# Patient Record
Sex: MALE | Race: WHITE | NOT HISPANIC OR LATINO | Employment: FULL TIME | URBAN - METROPOLITAN AREA
[De-identification: names, ages, dates, MRNs, and addresses within clinical notes are randomized per-mention and may not be internally consistent; named-entity substitution may affect disease eponyms.]

---

## 2017-02-13 ENCOUNTER — GENERIC CONVERSION - ENCOUNTER (OUTPATIENT)
Dept: OTHER | Facility: OTHER | Age: 57
End: 2017-02-13

## 2017-04-18 ENCOUNTER — ALLSCRIPTS OFFICE VISIT (OUTPATIENT)
Dept: OTHER | Facility: OTHER | Age: 57
End: 2017-04-18

## 2017-05-21 ENCOUNTER — HOSPITAL ENCOUNTER (EMERGENCY)
Facility: HOSPITAL | Age: 57
Discharge: HOME | End: 2017-05-22
Attending: EMERGENCY MEDICINE | Admitting: EMERGENCY MEDICINE
Payer: COMMERCIAL

## 2017-05-21 VITALS
SYSTOLIC BLOOD PRESSURE: 143 MMHG | WEIGHT: 213.19 LBS | OXYGEN SATURATION: 96 % | HEART RATE: 69 BPM | TEMPERATURE: 97.7 F | DIASTOLIC BLOOD PRESSURE: 86 MMHG | RESPIRATION RATE: 18 BRPM

## 2017-05-21 DIAGNOSIS — H10.829 ROSACEA BLEPHAROCONJUNCTIVITIS: Primary | ICD-10-CM

## 2017-05-21 RX ORDER — SIMVASTATIN 40 MG
40 TABLET ORAL
COMMUNITY
End: 2019-11-19

## 2017-05-21 RX ORDER — PROPARACAINE HYDROCHLORIDE 5 MG/ML
2 SOLUTION/ DROPS OPHTHALMIC ONCE
Status: COMPLETED | OUTPATIENT
Start: 2017-05-21 | End: 2017-05-21

## 2017-05-21 RX ORDER — ERYTHROMYCIN 5 MG/G
0.5 OINTMENT OPHTHALMIC ONCE
Status: COMPLETED | OUTPATIENT
Start: 2017-05-21 | End: 2017-05-21

## 2017-05-21 RX ORDER — ASPIRIN 81 MG/1
81 TABLET ORAL DAILY
COMMUNITY

## 2017-05-21 RX ORDER — OMEPRAZOLE 20 MG/1
20 CAPSULE, DELAYED RELEASE ORAL DAILY
COMMUNITY
Start: 2016-10-28

## 2017-05-21 RX ORDER — FAMOTIDINE 20 MG/1
20 TABLET, FILM COATED ORAL DAILY
COMMUNITY
End: 2019-11-19

## 2017-05-21 RX ORDER — ERYTHROMYCIN 5 MG/G
0.5 OINTMENT OPHTHALMIC
Qty: 3.5 G | Refills: 0 | Status: SHIPPED | OUTPATIENT
Start: 2017-05-21 | End: 2017-05-28

## 2017-05-21 RX ORDER — DOXYCYCLINE 50 MG/1
50 CAPSULE ORAL DAILY
COMMUNITY
Start: 2016-12-29

## 2017-05-21 RX ORDER — MELATONIN
5000 DAILY
COMMUNITY
End: 2019-11-19

## 2017-05-21 RX ADMIN — FLUORESCEIN SODIUM 1 STRIP: 1 STRIP OPHTHALMIC at 23:30

## 2017-05-21 RX ADMIN — ERYTHROMYCIN 0.5 INCH: 5 OINTMENT OPHTHALMIC at 23:30

## 2017-05-21 RX ADMIN — PROPARACAINE HYDROCHLORIDE 2 DROP: 5 SOLUTION/ DROPS OPHTHALMIC at 23:30

## 2017-05-22 PROCEDURE — 99283 EMERGENCY DEPT VISIT LOW MDM: CPT

## 2018-01-13 VITALS
DIASTOLIC BLOOD PRESSURE: 84 MMHG | WEIGHT: 211 LBS | HEIGHT: 70 IN | BODY MASS INDEX: 30.21 KG/M2 | SYSTOLIC BLOOD PRESSURE: 126 MMHG

## 2019-10-29 ENCOUNTER — HOSPITAL ENCOUNTER (EMERGENCY)
Facility: HOSPITAL | Age: 59
Discharge: HOME | End: 2019-10-29
Attending: EMERGENCY MEDICINE
Payer: OTHER MISCELLANEOUS

## 2019-10-29 ENCOUNTER — APPOINTMENT (EMERGENCY)
Dept: CT IMAGING | Facility: HOSPITAL | Age: 59
End: 2019-10-29
Payer: OTHER MISCELLANEOUS

## 2019-10-29 VITALS
SYSTOLIC BLOOD PRESSURE: 128 MMHG | RESPIRATION RATE: 20 BRPM | TEMPERATURE: 98.4 F | DIASTOLIC BLOOD PRESSURE: 70 MMHG | HEIGHT: 71 IN | BODY MASS INDEX: 30.1 KG/M2 | OXYGEN SATURATION: 97 % | WEIGHT: 215 LBS | HEART RATE: 99 BPM

## 2019-10-29 DIAGNOSIS — M54.17 LUMBOSACRAL RADICULOPATHY AT L4: Primary | ICD-10-CM

## 2019-10-29 DIAGNOSIS — M51.26 LUMBAR HERNIATED DISC: ICD-10-CM

## 2019-10-29 PROCEDURE — 99283 EMERGENCY DEPT VISIT LOW MDM: CPT

## 2019-10-29 PROCEDURE — 72131 CT LUMBAR SPINE W/O DYE: CPT

## 2019-10-29 PROCEDURE — 96372 THER/PROPH/DIAG INJ SC/IM: CPT

## 2019-10-29 PROCEDURE — 99284 EMERGENCY DEPT VISIT MOD MDM: CPT | Performed by: PHYSICIAN ASSISTANT

## 2019-10-29 RX ORDER — ACETAMINOPHEN 325 MG/1
975 TABLET ORAL ONCE
Status: COMPLETED | OUTPATIENT
Start: 2019-10-29 | End: 2019-10-29

## 2019-10-29 RX ORDER — KETOROLAC TROMETHAMINE 30 MG/ML
15 INJECTION, SOLUTION INTRAMUSCULAR; INTRAVENOUS ONCE
Status: COMPLETED | OUTPATIENT
Start: 2019-10-29 | End: 2019-10-29

## 2019-10-29 RX ORDER — LIDOCAINE 50 MG/G
1 PATCH TOPICAL ONCE
Status: DISCONTINUED | OUTPATIENT
Start: 2019-10-29 | End: 2019-10-29 | Stop reason: HOSPADM

## 2019-10-29 RX ADMIN — ACETAMINOPHEN 975 MG: 325 TABLET, FILM COATED ORAL at 12:11

## 2019-10-29 RX ADMIN — KETOROLAC TROMETHAMINE 15 MG: 30 INJECTION, SOLUTION INTRAMUSCULAR at 12:11

## 2019-10-29 RX ADMIN — LIDOCAINE 1 PATCH: 50 PATCH TOPICAL at 12:12

## 2019-10-29 NOTE — ED PROVIDER NOTES
History  Chief Complaint   Patient presents with    Back Pain     pt states he injured his back yesterday and is now c/o lower back pain that travels down his right leg  64yo male with a PMH of HLD and GERD presenting for evaluation of right-sided low back pain x 1 day  Patient was taking the garbage out when he felt a pull in his right lower back that radiated down to his right leg  His pain has been significant since that time  Patient has a tingling sensation in the anterior thigh to the right knee  He was seen at an urgent care yesterday and was prescribed ibuprofen, prednisone, and a muscle relaxer which he has been compliant with  Patient's pain has continued which prompted him to be re-evaluated  Patient reports that his legs have given out twice due to his symptoms  Pain is exacerbated by walking and twisting  No previous back injuries  Patient denies fever, chills, urinary retention, dysuria, incontinence, saddle anesthesia, weight loss, IV drug use  History provided by:  Patient   used: No    Back Pain   Location:  Lumbar spine  Quality:  Shooting  Radiates to:  L thigh  Pain severity:  Moderate  Pain is:  Unable to specify  Onset quality:  Sudden  Duration:  1 day  Timing:  Constant  Progression:  Worsening  Chronicity:  New  Context: twisting    Context: not falling    Relieved by:  Nothing  Worsened by:  Bending, movement and twisting  Ineffective treatments:  Ibuprofen and muscle relaxants (Prednisone)  Associated symptoms: numbness, paresthesias and tingling    Associated symptoms: no abdominal pain, no bladder incontinence, no bowel incontinence, no chest pain, no dysuria, no fever, no headaches, no perianal numbness and no weight loss        Prior to Admission Medications   Prescriptions Last Dose Informant Patient Reported? Taking?    Multiple Vitamins-Minerals (OCUVITE PO)   Yes No   Sig: Take by mouth daily   aspirin (ECOTRIN LOW STRENGTH) 81 mg EC tablet   Yes No Sig: Take 81 mg by mouth daily   cholecalciferol (VITAMIN D3) 1,000 units tablet   Yes No   Sig: Take 5,000 Units by mouth daily   doxycycline monohydrate (MONODOX) 50 mg capsule   Yes No   Si mg daily   famotidine (PEPCID) 20 mg tablet   Yes No   Sig: Take 20 mg by mouth daily   omeprazole (PriLOSEC) 20 mg delayed release capsule   Yes No   Si mg daily   simvastatin (ZOCOR) 40 mg tablet   Yes No   Sig: Take 40 mg by mouth daily at bedtime      Facility-Administered Medications: None       Past Medical History:   Diagnosis Date    Hyperlipidemia        History reviewed  No pertinent surgical history  History reviewed  No pertinent family history  I have reviewed and agree with the history as documented  Social History     Tobacco Use    Smoking status: Former Smoker     Last attempt to quit:      Years since quittin 8    Smokeless tobacco: Never Used   Substance Use Topics    Alcohol use: No    Drug use: No        Review of Systems   Constitutional: Negative for chills, fever and weight loss  Cardiovascular: Negative for chest pain  Gastrointestinal: Negative for abdominal pain and bowel incontinence  Genitourinary: Negative for bladder incontinence and dysuria  Musculoskeletal: Positive for back pain  Negative for joint swelling, neck pain and neck stiffness  Skin: Negative for wound  Neurological: Positive for tingling, numbness and paresthesias  Negative for headaches  Psychiatric/Behavioral: Negative for confusion  All other systems reviewed and are negative  Physical Exam  Physical Exam   Constitutional: He appears well-developed and well-nourished  No distress  Non-toxic appearing   HENT:   Head: Normocephalic and atraumatic  Right Ear: External ear normal    Left Ear: External ear normal    Eyes: Right eye exhibits no discharge  Left eye exhibits no discharge  No scleral icterus  Cardiovascular: Normal rate, regular rhythm and normal heart sounds  No murmur heard  Pulmonary/Chest: Effort normal and breath sounds normal  No stridor  No respiratory distress  He has no wheezes  He has no rales  Abdominal: He exhibits no distension  There is no tenderness  Musculoskeletal: Normal range of motion  He exhibits no edema or deformity  Right paraspinal tenderness in lumbar region  No midline tenderness   Neurological: He is alert  He has normal strength  He is not disoriented  GCS eye subscore is 4  GCS verbal subscore is 5  GCS motor subscore is 6  Reflex Scores:       Patellar reflexes are 2+ on the right side and 1+ on the left side  5/5 strength in b/l lower extremities  Gross sensation intact  Decreased patellar reflex on left  Skin: Skin is warm and dry  He is not diaphoretic  Psychiatric: He has a normal mood and affect  His behavior is normal    Nursing note and vitals reviewed  Vital Signs  ED Triage Vitals [10/29/19 1048]   Temperature Pulse Respirations Blood Pressure SpO2   98 4 °F (36 9 °C) 99 20 128/70 97 %      Temp Source Heart Rate Source Patient Position - Orthostatic VS BP Location FiO2 (%)   Oral Monitor Sitting Left arm --      Pain Score       Worst Possible Pain           Vitals:    10/29/19 1048   BP: 128/70   Pulse: 99   Patient Position - Orthostatic VS: Sitting         Visual Acuity      ED Medications  Medications   ketorolac (TORADOL) injection 15 mg (15 mg Intramuscular Given 10/29/19 1211)   acetaminophen (TYLENOL) tablet 975 mg (975 mg Oral Given 10/29/19 1211)       Diagnostic Studies  Results Reviewed     None                 CT spine lumbar without contrast   Final Result by Kayley Canada DO (10/29 1249)      Disc herniations at the L3-4, L4-5 and L5-S1 levels  At L3-4 there is a superiorly extruded right sided disc herniation with lateral recess stenosis and probable compression of the L4 nerve as it extends towards the neural foramen  Correlate for corresponding radiculopathy        Central disc herniations at the L4-5 and L5-S1 levels  Probable renal cysts, incompletely evaluated on this examination  Consider nonemergent renal ultrasound follow-up  Workstation performed: [id number]                    Procedures  Procedures       ED Course                 MDM  Number of Diagnoses or Management Options  Lumbar herniated disc: new and requires workup  Lumbosacral radiculopathy at L4: new and requires workup  Diagnosis management comments: 56-year-old male presenting for evaluation of right-sided low back pain radiating to the right thigh  Associated symptoms include paresthesias and subjective weakness to the right leg  No red flags in history including no saddle anesthesia or incontinence  On exam, full strength and sensation in bilateral legs  There is a decreased patellar reflex on the right side  Suspect herniated disc  CT lumbar spine obtained which reveals multilevel herniated disc with compression of L4 nerve root  This is consistent with patient's exam   Patient treated with Tylenol, Toradol, and lidocaine patch to ED with improvement in symptoms  Patient feels well for discharge  Advised patient to continue current regimen of steroid, muscle relaxer, and ibuprofen  Instructed patient to take Tylenol as well and had lidocaine patches  Referral given for physical therapy and orthopedics  Advised close PCP follow-up  Strict ED return precautions discussed  Patient expressed understanding and is agreeable to plan  Patient discharged in stable condition             Amount and/or Complexity of Data Reviewed  Tests in the radiology section of CPT®: ordered and reviewed  Review and summarize past medical records: yes  Independent visualization of images, tracings, or specimens: yes    Risk of Complications, Morbidity, and/or Mortality  Presenting problems: moderate  Diagnostic procedures: moderate  Management options: moderate    Patient Progress  Patient progress: stable      Disposition  Final diagnoses:   Lumbosacral radiculopathy at L4   Lumbar herniated disc     Time reflects when diagnosis was documented in both MDM as applicable and the Disposition within this note     Time User Action Codes Description Comment    10/29/2019  1:01  [facility] [address] [place] [M54 [id number]] Lumbosacral radiculopathy at L4     10/29/2019  1:01  [facility] [address] [place] [[id number]] Lumbar herniated disc       ED Disposition     ED Disposition Condition Date/Time Comment    Discharge Stable Tue Oct 29, 2019  1:01 PM [facility] discharge to home/self care  Follow-up Information     Follow up With Specialties Details Why [address] [facility] Schedule an appointment as soon as possible for a visit   [address]  [facility]  [place] [address] [facility] [address]  [address] [facility] Schedule an appointment as soon as possible for a visit   [address] [facility] [address]  [address] [facility] [address], [place], [place], [address] [facility]    [address] [facility]  [address] Emergency Department Emergency Medicine  If symptoms worsen [address] [facility] [address]  [address] [place] [address] ED, [address] [place], [place], [address]          Discharge Medication List as of 10/29/2019  1:04 PM      CONTINUE these medications which have NOT CHANGED    Details   aspirin (ECOTRIN LOW STRENGTH) 81 mg EC tablet Take 81 mg by mouth daily, Until Discontinued, Historical Med      cholecalciferol (VITAMIN D3) 1,000 units tablet Take 5,000 Units by mouth daily, Until Discontinued, Historical Med      doxycycline monohydrate (MONODOX) 50 mg capsule 50 mg daily, Starting 12/29/2016, Until Discontinued, Historical Med      famotidine (PEPCID) 20 mg tablet Take 20 mg by mouth daily, Until Discontinued, Historical Med      Multiple Vitamins-Minerals (OCUVITE PO) Take by mouth daily, Until Discontinued, Historical Med      omeprazole (PriLOSEC) 20 mg delayed release capsule 20 mg daily, Starting 10/28/2016, Until Discontinued, Historical Med      simvastatin (ZOCOR) 40 mg tablet Take 40 mg by mouth daily at bedtime, Until Discontinued, Historical Med           No discharge procedures on file      ED Provider  Electronically Signed by           Georgiana Galvez PA-C  10/29/19 3002

## 2019-10-29 NOTE — DISCHARGE INSTRUCTIONS
Continue taking muscle relaxer, ibuprofen, and prednisone  Start taking Tylenol 650mg every 6 hours  Use lidocaine patches daily (12 hours on, 12 hours off)  Follow-up with your PCP, physical therapy, and orthopedics

## 2019-11-19 VITALS
BODY MASS INDEX: 30.13 KG/M2 | HEART RATE: 92 BPM | SYSTOLIC BLOOD PRESSURE: 153 MMHG | HEIGHT: 71 IN | DIASTOLIC BLOOD PRESSURE: 86 MMHG | WEIGHT: 215.2 LBS

## 2019-11-19 DIAGNOSIS — M54.16 RADICULOPATHY, LUMBAR REGION: Primary | ICD-10-CM

## 2019-11-19 DIAGNOSIS — R20.0 RIGHT LEG NUMBNESS: ICD-10-CM

## 2019-11-19 DIAGNOSIS — R29.2 DECREASED RIGHT PATELLAR REFLEX: ICD-10-CM

## 2019-11-19 DIAGNOSIS — M51.26 LUMBAR HERNIATED DISC: ICD-10-CM

## 2019-11-19 PROCEDURE — 99204 OFFICE O/P NEW MOD 45 MIN: CPT | Performed by: EMERGENCY MEDICINE

## 2019-11-19 RX ORDER — ATORVASTATIN CALCIUM 40 MG/1
40 TABLET, FILM COATED ORAL DAILY
COMMUNITY
Start: 2019-11-14

## 2019-11-19 NOTE — PROGRESS NOTES
Assessment/Plan:    Diagnoses and all orders for this visit:    Radiculopathy, lumbar region  -     Ambulatory referral to Physical Therapy; Future    Right leg numbness  -     Ambulatory referral to Physical Therapy; Future    Lumbar herniated disc  -     Ambulatory referral to Physical Therapy; Future    Decreased right patellar reflex    Other orders  -     Cholecalciferol (D3 VITAMIN PO); Take 5,000 Units by mouth daily  -     atorvastatin (LIPITOR) 40 mg tablet; Take 40 mg by mouth daily     Patient presents with right-sided lumbar radiculopathy with extruded right lumbar disc seen on CT scan corresponding to his symptoms  He does note significant improvement status post prescription medications including prednisone ibuprofen and a muscle relaxant  We will start physical therapy for core strengthening and will return the patient to restricted duty at his approval   At this point in time we will hold off on referral to pain management but this may be considered in the future  He may continue ibuprofen  Patient is scheduled to f/u with [facility]   Work note provided, reviewed prior ER note and imaging  Return in about 2 weeks (around 12/3/2019)  Chief Complaint:     Chief Complaint   Patient presents with    Spine - Pain       Subjective:   Patient ID: Ruby Esteban is a 63 y o  male  WC DOI 10/28/19  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[employer]  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 starting at work while lifting and throwing 30lb bags of trash over his shoulder, experiencing right lower back pain but more significantly right leg pain more severe over the anterior aspect of the knee with radiation down the lower extremity past the knee to the ankle and foot area along with anterior numbness tingling  He was evaluated in urgent care, ER, CT Lumbar spine obtained revealing herniated discs, was treating with chiro  Prescribed prednisone, ibuprofen, tylenol, currently taking Ibuprofen    He did note that he had a decreased reflex of the knee on exam   Currently he feels much improved with his symptoms and is able to moving ambulate much better  He has been on light duty over the past week which is primarily been sedentary  At this point in time he does feel good enough to go back to light duty or close to full duty as he states there is no significant amounts of lifting normally for his job  Denies any changes in bowel or bladder habits  Review of Systems   Constitutional: Negative for chills and fever  HENT: Negative for sore throat and trouble swallowing  Eyes: Negative for pain and discharge  Respiratory: Negative for choking and shortness of breath  Cardiovascular: Negative for chest pain and palpitations  Gastrointestinal: Negative for abdominal pain and vomiting  Endocrine: Negative for cold intolerance and heat intolerance  Musculoskeletal: Positive for arthralgias and back pain  Neurological: Positive for numbness  Negative for dizziness and weakness  Psychiatric/Behavioral: Negative for self-injury and suicidal ideas  The following portions of the patient's chart were reviewed and updated as appropriate: Allergy:  No Known Allergies      Past Medical History:   Diagnosis Date    Hyperlipidemia        History reviewed  No pertinent surgical history      Social History     Socioeconomic History    Marital status: /Civil Union     Spouse name: Not on file    Number of children: Not on file    Years of education: Not on file    Highest education level: Not on file   Occupational History    Not on file   Social Needs    Financial resource strain: Not on file    Food insecurity:     Worry: Not on file     Inability: Not on file    Transportation needs:     Medical: Not on file     Non-medical: Not on file   Tobacco Use    Smoking status: Former Smoker     Last attempt to quit:      Years since quittin 8    Smokeless tobacco: Never Used Substance and Sexual Activity    Alcohol use: No    Drug use: No    Sexual activity: Not on file   Lifestyle    Physical activity:     Days per week: Not on file     Minutes per session: Not on file    Stress: Not on file   Relationships    Social connections:     Talks on phone: Not on file     Gets together: Not on file     Attends Anabaptist service: Not on file     Active member of club or organization: Not on file     Attends meetings of clubs or organizations: Not on file     Relationship status: Not on file    Intimate partner violence:     Fear of current or ex partner: Not on file     Emotionally abused: Not on file     Physically abused: Not on file     Forced sexual activity: Not on file   Other Topics Concern    Not on file   Social History Narrative    Not on file       Family History   Problem Relation Age of Onset    Stroke Mother     Diabetes Father        Medications:    Current Outpatient Medications:     aspirin (ECOTRIN LOW STRENGTH) 81 mg EC tablet, Take 81 mg by mouth daily, Disp: , Rfl:     atorvastatin (LIPITOR) 40 mg tablet, Take 40 mg by mouth daily, Disp: , Rfl:     Cholecalciferol (D3 VITAMIN PO), Take 5,000 Units by mouth daily, Disp: , Rfl:     doxycycline monohydrate (MONODOX) 50 mg capsule, 50 mg daily, Disp: , Rfl:     Multiple Vitamins-Minerals (OCUVITE PO), Take by mouth daily, Disp: , Rfl:     omeprazole (PriLOSEC) 20 mg delayed release capsule, 20 mg daily, Disp: , Rfl:     There is no problem list on file for this patient  Objective:  /86   Pulse 92   Ht 5' 11" (1 803 m)   Wt 97 6 kg (215 lb 3 2 oz)   BMI 30 01 kg/m²     Back Exam     Range of Motion   Extension: normal   Flexion: normal     Muscle Strength   Right Quadriceps:  5/5   Left Quadriceps:  5/5     Tests   Straight leg raise right: negative  Straight leg raise left: negative    Reflexes   Patellar reflexes: Decreased reflex on the right compared to the left      Other   Toe walk: normal  Heel walk: normal  Sensation: normal  Gait: normal   Erythema: no back redness    Comments:  Fatty lipoma was noted on the lower lumbar spine which are nontender            Physical Exam   Constitutional: He is oriented to person, place, and time  He appears well-developed and well-nourished  HENT:   Head: Normocephalic and atraumatic  Eyes: Conjunctivae are normal    Neck: Neck supple  Pulmonary/Chest: Effort normal    Neurological: He is alert and oriented to person, place, and time  Gait normal    Reflex Scores:       Patellar reflexes are 1+ on the right side and 2+ on the left side  Skin: Skin is warm and dry  Psychiatric: He has a normal mood and affect  His behavior is normal    Vitals reviewed  Neurologic Exam     Mental Status   Oriented to person, place, and time  Motor Exam     Strength   Right iliopsoas: 4/5  Left iliopsoas: 5/5  Right quadriceps: 5/5  Left quadriceps: 5/5    Sensory Exam   Right leg light touch: normal  Left leg light touch: normal    Gait, Coordination, and Reflexes     Gait  Gait: normal    Reflexes   Right patellar: 1+  Left patellar: 2+      Procedures    I have personally reviewed the written report of the pertinent studies     CT Lumbar Spine

## 2019-11-19 NOTE — LETTER
November 19, 2019     Patient: Arnie Ng   YOB: 1960   Date of Visit: 11/19/2019       To Whom it May Concern:    Arnie Ng is under my professional care  He was seen in my office on 11/19/2019  He may return to restricted duty:  Max lifting 25lb  Follow up in 2 weeks  If you have any questions or concerns, please don't hesitate to call           Sincerely,          Justin King MD        CC: No Recipients

## 2019-11-20 ENCOUNTER — APPOINTMENT (OUTPATIENT)
Dept: OCCUPATIONAL MEDICINE | Facility: CLINIC | Age: 59
End: 2019-11-20
Payer: OTHER MISCELLANEOUS

## 2019-11-20 PROCEDURE — 99213 OFFICE O/P EST LOW 20 MIN: CPT

## 2019-11-21 ENCOUNTER — TELEPHONE (OUTPATIENT)
Dept: OBGYN CLINIC | Facility: CLINIC | Age: 59
End: 2019-11-21

## 2019-11-21 NOTE — TELEPHONE ENCOUNTER
Campos Rivera from [employer] called in yesterday in regards to [place]  She is asking for a copy of the Physical Therapy script  I do not see a "release of medical information" but since this is Workers Comp I am not sure how that plays a role in it    Campos Rivera C/b # [phone]  Campos Rivera fax # [phone]

## 2019-12-04 ENCOUNTER — OFFICE VISIT (OUTPATIENT)
Dept: OBGYN CLINIC | Facility: MEDICAL CENTER | Age: 59
End: 2019-12-04
Payer: OTHER MISCELLANEOUS

## 2019-12-04 VITALS
BODY MASS INDEX: 30.8 KG/M2 | WEIGHT: 220 LBS | DIASTOLIC BLOOD PRESSURE: 76 MMHG | SYSTOLIC BLOOD PRESSURE: 117 MMHG | HEART RATE: 77 BPM | HEIGHT: 71 IN

## 2019-12-04 DIAGNOSIS — M54.16 RADICULOPATHY, LUMBAR REGION: Primary | ICD-10-CM

## 2019-12-04 DIAGNOSIS — R29.2 DECREASED RIGHT PATELLAR REFLEX: ICD-10-CM

## 2019-12-04 DIAGNOSIS — M51.26 LUMBAR HERNIATED DISC: ICD-10-CM

## 2019-12-04 DIAGNOSIS — R20.0 RIGHT LEG NUMBNESS: ICD-10-CM

## 2019-12-04 PROCEDURE — 99213 OFFICE O/P EST LOW 20 MIN: CPT | Performed by: EMERGENCY MEDICINE

## 2019-12-04 RX ORDER — IBUPROFEN 600 MG/1
600 TABLET ORAL EVERY 6 HOURS PRN
Qty: 30 TABLET | Refills: 1 | Status: SHIPPED | OUTPATIENT
Start: 2019-12-04 | End: 2020-01-07

## 2019-12-04 NOTE — PROGRESS NOTES
Assessment/Plan:    Diagnoses and all orders for this visit:    Radiculopathy, lumbar region  -     ibuprofen (MOTRIN) 600 mg tablet; Take 1 tablet (600 mg total) by mouth every 6 (six) hours as needed for mild pain    Right leg numbness    Lumbar herniated disc    Decreased right patellar reflex    Continue PT, ibuprofen, work restrictions  Patient notes improvement of symptoms objectively he does have mild weakness on the L to L3 distribution with decreased L4 patellar reflex on the right  Will hold off on referral to pain management at this time but we did discuss    Return in about 2 weeks (around 12/18/2019)  Chief Complaint:     Chief Complaint   Patient presents with    Spine - Follow-up       Subjective:   Patient ID: Arnie Ng is a 63 y o  male  WC DOI 10/28/19  Patient returns noticing improvement of symptoms he does notice some mild weakness of the right thigh  He will needs refill on ibuprofen he has been tolerating work restrictions and believes that staying active is helping his symptoms  He did start physical therapy has attended 1 formal thought session and has participated in some home exercises  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 starting at work while lifting and throwing 30lb bags of trash over his shoulder, experiencing right lower back pain but more significantly right leg pain more severe over the anterior aspect of the knee with radiation down the lower extremity past the knee to the ankle and foot area along with anterior numbness tingling  He was evaluated in urgent care, ER, CT Lumbar spine obtained revealing herniated discs, was treating with chiro  Prescribed prednisone, ibuprofen, tylenol, currently taking Ibuprofen  He did note that he had a decreased reflex of the knee on exam   Currently he feels much improved with his symptoms and is able to moving ambulate much better    He has been on light duty over the past week which is primarily been sedentary  At this point in time he does feel good enough to go back to light duty or close to full duty as he states there is no significant amounts of lifting normally for his job  Denies any changes in bowel or bladder habits  Review of Systems    The following portions of the patient's chart were reviewed and updated as appropriate: Allergy:  No Known Allergies      Past Medical History:   Diagnosis Date    Hyperlipidemia        History reviewed  No pertinent surgical history      Social History     Socioeconomic History    Marital status: /Civil Union     Spouse name: Not on file    Number of children: Not on file    Years of education: Not on file    Highest education level: Not on file   Occupational History    Not on file   Social Needs    Financial resource strain: Not on file    Food insecurity:     Worry: Not on file     Inability: Not on file    Transportation needs:     Medical: Not on file     Non-medical: Not on file   Tobacco Use    Smoking status: Former Smoker     Last attempt to quit:      Years since quittin 9    Smokeless tobacco: Never Used   Substance and Sexual Activity    Alcohol use: No    Drug use: No    Sexual activity: Not on file   Lifestyle    Physical activity:     Days per week: Not on file     Minutes per session: Not on file    Stress: Not on file   Relationships    Social connections:     Talks on phone: Not on file     Gets together: Not on file     Attends [employer]ist service: Not on file     Active member of club or organization: Not on file     Attends meetings of clubs or organizations: Not on file     Relationship status: Not on file    Intimate partner violence:     Fear of current or ex partner: Not on file     Emotionally abused: Not on file     Physically abused: Not on file     Forced sexual activity: Not on file   Other Topics Concern    Not on file   Social History Narrative    Not on file       Family History   Problem Relation Age of Onset    Stroke Mother     Diabetes Father        Medications:    Current Outpatient Medications:     aspirin (ECOTRIN LOW STRENGTH) 81 mg EC tablet, Take 81 mg by mouth daily, Disp: , Rfl:     atorvastatin (LIPITOR) 40 mg tablet, Take 40 mg by mouth daily, Disp: , Rfl:     Cholecalciferol (D3 VITAMIN PO), Take 5,000 Units by mouth daily, Disp: , Rfl:     doxycycline monohydrate (MONODOX) 50 mg capsule, 50 mg daily, Disp: , Rfl:     Multiple Vitamins-Minerals (OCUVITE PO), Take by mouth daily, Disp: , Rfl:     omeprazole (PriLOSEC) 20 mg delayed release capsule, 20 mg daily, Disp: , Rfl:     ibuprofen (MOTRIN) 600 mg tablet, Take 1 tablet (600 mg total) by mouth every 6 (six) hours as needed for mild pain, Disp: 30 tablet, Rfl: 1    There is no problem list on file for this patient  Objective:  /76   Pulse 77   Ht 5' 11" (1 803 m)   Wt 99 8 kg (220 lb)   BMI 30 68 kg/m²     Back Exam     Muscle Strength   Right Quadriceps:  4/5   Left Quadriceps:  5/5     Tests   Straight leg raise right: negative    Reflexes   Patellar: abnormal    Other   Toe walk: normal  Heel walk: normal  Gait: normal     Comments:  Decreased reflex L4 on the right    There is mild weakness of right hip flexion            Physical Exam      Neurologic Exam     Motor Exam     Strength   Right quadriceps: 4/5  Left quadriceps: 5/5      Procedures    I have personally reviewed the written report of the pertinent studies  CT Lumbar Spine  IMPRESSION:     Disc herniations at the L3-4, L4-5 and L5-S1 levels        At L3-4 there is a superiorly extruded right sided disc herniation with lateral recess stenosis and probable compression of the L4 nerve as it extends towards the neural foramen  Correlate for corresponding radiculopathy      Central disc herniations at the L4-5 and L5-S1 levels      Probable renal cysts, incompletely evaluated on this examination    Consider nonemergent renal ultrasound follow-up

## 2019-12-04 NOTE — LETTER
December 4, 2019     Patient: Sharonda Encinas   YOB: 1960   Date of Visit: 12/4/2019       To Whom it May Concern:    Sharonda Encinas is under my professional care  He was seen in my office on 12/4/2019  He may return to restricted duty:  Max lifting 25lb  Follow up in 2 weeks  If you have any questions or concerns, please don't hesitate to call           Sincerely,          Lina Bennett MD        CC: No Recipients

## 2019-12-17 ENCOUNTER — OFFICE VISIT (OUTPATIENT)
Dept: OBGYN CLINIC | Facility: MEDICAL CENTER | Age: 59
End: 2019-12-17
Payer: OTHER MISCELLANEOUS

## 2019-12-17 VITALS
HEIGHT: 71 IN | SYSTOLIC BLOOD PRESSURE: 126 MMHG | DIASTOLIC BLOOD PRESSURE: 80 MMHG | BODY MASS INDEX: 30.18 KG/M2 | HEART RATE: 85 BPM | WEIGHT: 215.6 LBS

## 2019-12-17 DIAGNOSIS — M54.16 RADICULOPATHY, LUMBAR REGION: Primary | ICD-10-CM

## 2019-12-17 DIAGNOSIS — M51.26 LUMBAR HERNIATED DISC: ICD-10-CM

## 2019-12-17 DIAGNOSIS — R20.0 RIGHT LEG NUMBNESS: ICD-10-CM

## 2019-12-17 DIAGNOSIS — R29.2 DECREASED RIGHT PATELLAR REFLEX: ICD-10-CM

## 2019-12-17 PROCEDURE — 99213 OFFICE O/P EST LOW 20 MIN: CPT | Performed by: EMERGENCY MEDICINE

## 2019-12-17 RX ORDER — MELOXICAM 7.5 MG/1
7.5 TABLET ORAL DAILY
Qty: 30 TABLET | Refills: 0 | Status: SHIPPED | OUTPATIENT
Start: 2019-12-17 | End: 2020-02-05

## 2019-12-17 NOTE — PATIENT INSTRUCTIONS
While taking meloxicam do not take any other NSAIDs such as Advil, ibuprofen, naproxen or Aleve  However you may take Tylenol    Continue physical therapy and work restrictions

## 2019-12-17 NOTE — LETTER
December 17, 2019     Patient: Bunny Tabor   YOB: 1960   Date of Visit: 12/17/2019       To Whom it May Concern:    Bunny Tabor is under my professional care  He was seen in my office on 12/17/2019  He may return to restricted duty:  Max lifting 25lb  Follow up in 3 weeks  If you have any questions or concerns, please don't hesitate to call           Sincerely,          Deacon Recio MD        CC: No Recipients

## 2019-12-17 NOTE — LETTER
December 17, 2019     Patient: Mitch Bui   YOB: 1960   Date of Visit: 12/17/2019       To Whom it May Concern:    Mitch Bui is under my professional care  He was seen in my office on 12/17/2019  He may return to restricted duty:  Max lifting 25lb  Follow up in 2 weeks  If you have any questions or concerns, please don't hesitate to call           Sincerely,          Jefferson Ovalles MD        CC: No Recipients

## 2019-12-17 NOTE — PROGRESS NOTES
Assessment/Plan:    Diagnoses and all orders for this visit:    Radiculopathy, lumbar region  -     meloxicam (MOBIC) 7 5 mg tablet; Take 1 tablet (7 5 mg total) by mouth daily    Lumbar herniated disc    Right leg numbness    Decreased right patellar reflex     Patient is improved overall he denies any pain, reflexes and strength have improved since last evaluation  We did discuss referral to Pain Management however patient declines at this time, as he does note improvement overall since onset  He should continue physical therapy I have prescribed meloxicam to replace ibuprofen and have provided instructions  Continue restrictions and PT  If no further improvement or worsening symptoms, T/C MRI Lumbar spine (as this is more specific and sensitive compared to CT) and referral to Pain  Return in about 3 weeks (around 1/7/2020)  Chief Complaint:     Chief Complaint   Patient presents with    Spine - Follow-up       Subjective:   Patient ID: Sharonda Encinas is a 63 y o  male  WC DOI 10/28/19  Patient returns with mild improvement, he has continued PT, denies 'pain' but does have discomfort of the lower back and right anterior thigh, as well as numbness of the right knee  He is taking ibuprofen usually once daily  He did notice weakness right leg trying to step up on stool at home  He has been tolerating work restrictions  Previous note:  Patient returns noticing improvement of symptoms he does notice some mild weakness of the right thigh  He will needs refill on ibuprofen he has been tolerating work restrictions and believes that staying active is helping his symptoms  He did start physical therapy has attended 1 formal thought session and has participated in some home exercises      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[employer]  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 starting at work while lifting and throwing 30lb bags of trash over his shoulder, experiencing right lower back pain but more significantly right leg pain more severe over the anterior aspect of the knee with radiation down the lower extremity past the knee to the ankle and foot area along with anterior numbness tingling  He was evaluated in urgent care, ER, CT Lumbar spine obtained revealing herniated discs, was treating with chiro  Prescribed prednisone, ibuprofen, tylenol, currently taking Ibuprofen  He did note that he had a decreased reflex of the knee on exam   Currently he feels much improved with his symptoms and is able to moving ambulate much better  He has been on light duty over the past week which is primarily been sedentary  At this point in time he does feel good enough to go back to light duty or close to full duty as he states there is no significant amounts of lifting normally for his job  Denies any changes in bowel or bladder habits  Review of Systems   Constitutional: Negative for fever  Respiratory: Negative for shortness of breath  Cardiovascular: Negative for chest pain  Gastrointestinal: Negative for abdominal pain  Musculoskeletal: Positive for back pain  Neurological: Negative for weakness  The following portions of the patient's chart were reviewed and updated as appropriate: Allergy:  No Known Allergies      Past Medical History:   Diagnosis Date    Hyperlipidemia        History reviewed  No pertinent surgical history      Social History     Socioeconomic History    Marital status: /Civil Union     Spouse name: Not on file    Number of children: Not on file    Years of education: Not on file    Highest education level: Not on file   Occupational History    Not on file   Social Needs    Financial resource strain: Not on file    Food insecurity:     Worry: Not on file     Inability: Not on file    Transportation needs:     Medical: Not on file     Non-medical: Not on file   Tobacco Use    Smoking status: Former Smoker     Last attempt to quit: 1997     Years since quittin 9    Smokeless tobacco: Never Used   Substance and Sexual Activity    Alcohol use: No    Drug use: No    Sexual activity: Not on file   Lifestyle    Physical activity:     Days per week: Not on file     Minutes per session: Not on file    Stress: Not on file   Relationships    Social connections:     Talks on phone: Not on file     Gets together: Not on file     Attends Taoist service: Not on file     Active member of club or organization: Not on file     Attends meetings of clubs or organizations: Not on file     Relationship status: Not on file    Intimate partner violence:     Fear of current or ex partner: Not on file     Emotionally abused: Not on file     Physically abused: Not on file     Forced sexual activity: Not on file   Other Topics Concern    Not on file   Social History Narrative    Not on file       Family History   Problem Relation Age of Onset    Stroke Mother     Diabetes Father        Medications:    Current Outpatient Medications:     aspirin (ECOTRIN LOW STRENGTH) 81 mg EC tablet, Take 81 mg by mouth daily, Disp: , Rfl:     atorvastatin (LIPITOR) 40 mg tablet, Take 40 mg by mouth daily, Disp: , Rfl:     Cholecalciferol (D3 VITAMIN PO), Take 5,000 Units by mouth daily, Disp: , Rfl:     doxycycline monohydrate (MONODOX) 50 mg capsule, 50 mg daily, Disp: , Rfl:     ibuprofen (MOTRIN) 600 mg tablet, Take 1 tablet (600 mg total) by mouth every 6 (six) hours as needed for mild pain, Disp: 30 tablet, Rfl: 1    Multiple Vitamins-Minerals (OCUVITE PO), Take by mouth daily, Disp: , Rfl:     omeprazole (PriLOSEC) 20 mg delayed release capsule, 20 mg daily, Disp: , Rfl:     meloxicam (MOBIC) 7 5 mg tablet, Take 1 tablet (7 5 mg total) by mouth daily, Disp: 30 tablet, Rfl: 0    There is no problem list on file for this patient        Objective:  /80   Pulse 85   Ht 5' 11" (1 803 m)   Wt 97 8 kg (215 lb 9 6 oz)   BMI 30 07 kg/m²     Back Exam     Muscle Strength   The patient has normal back strength  Reflexes   Patellar: abnormal    Other   Toe walk: normal  Heel walk: normal  Sensation: normal  Gait: normal   Erythema: no back redness    Comments: There is decreased right patellar reflex compared to the left however this is improved since last evaluation  There is improvement of the strength of the right lower extremity compared to last evaluation            Physical Exam      Neurologic Exam    Procedures    I have personally reviewed the written report of the pertinent studies  CT Lumbar Spine  IMPRESSION:     Disc herniations at the L3-4, L4-5 and L5-S1 levels        At L3-4 there is a superiorly extruded right sided disc herniation with lateral recess stenosis and probable compression of the L4 nerve as it extends towards the neural foramen  Correlate for corresponding radiculopathy      Central disc herniations at the L4-5 and L5-S1 levels      Probable renal cysts, incompletely evaluated on this examination    Consider nonemergent renal ultrasound follow-up

## 2020-01-07 ENCOUNTER — OFFICE VISIT (OUTPATIENT)
Dept: OBGYN CLINIC | Facility: MEDICAL CENTER | Age: 60
End: 2020-01-07
Payer: OTHER MISCELLANEOUS

## 2020-01-07 VITALS
DIASTOLIC BLOOD PRESSURE: 85 MMHG | WEIGHT: 219.4 LBS | SYSTOLIC BLOOD PRESSURE: 125 MMHG | BODY MASS INDEX: 30.72 KG/M2 | HEART RATE: 98 BPM | HEIGHT: 71 IN

## 2020-01-07 DIAGNOSIS — M51.26 LUMBAR HERNIATED DISC: ICD-10-CM

## 2020-01-07 DIAGNOSIS — M54.16 RADICULOPATHY, LUMBAR REGION: Primary | ICD-10-CM

## 2020-01-07 DIAGNOSIS — R20.0 RIGHT LEG NUMBNESS: ICD-10-CM

## 2020-01-07 DIAGNOSIS — R29.2 DECREASED RIGHT PATELLAR REFLEX: ICD-10-CM

## 2020-01-07 PROCEDURE — 99213 OFFICE O/P EST LOW 20 MIN: CPT | Performed by: EMERGENCY MEDICINE

## 2020-01-07 NOTE — LETTER
January 7, 2020     Patient: Elizabeth Cuevas   YOB: 1960   Date of Visit: 1/7/2020       To Whom it May Concern:    Elizabeth Cuevas is under my professional care  He was seen in my office on 1/7/2020  He may continue restricted duty:  Max lifting 25lb  Follow up in 3 weeks  If you have any questions or concerns, please don't hesitate to call           Sincerely,          Diego Ellis MD        CC: No Recipients

## 2020-01-07 NOTE — PROGRESS NOTES
Assessment/Plan:    Diagnoses and all orders for this visit:    Radiculopathy, lumbar region  -     Ambulatory referral to Physical Therapy; Future    Lumbar herniated disc  -     Ambulatory referral to Physical Therapy; Future    Right leg numbness    Decreased right patellar reflex  -     Ambulatory referral to Physical Therapy; Future    Patient with improving symptoms we will order more physical therapy I have recommended he take hiatus from his meloxicam continue work restrictions follow-up in 3 weeks  Return in about 3 weeks (around 1/28/2020)  Chief Complaint:     Chief Complaint   Patient presents with    Spine - Follow-up       Subjective:   Patient ID: Sergio Van is a 63 y o  male  WC DOI 10/28/19  Patient returns with mild improvement, pain has improved from 6/10 to 4/10  He states pain in the quad "has gone away"  Tolerating work restrictions  He is requesting more PT  Denies weakness  Taking meloxicam       Previous note:  Patient returns with mild improvement, he has continued PT, denies 'pain' but does have discomfort of the lower back and right anterior thigh, as well as numbness of the right knee  He is taking ibuprofen usually once daily  He did notice weakness right leg trying to step up on stool at home  He has been tolerating work restrictions  Previous note:  Patient returns noticing improvement of symptoms he does notice some mild weakness of the right thigh  He will needs refill on ibuprofen he has been tolerating work restrictions and believes that staying active is helping his symptoms  He did start physical therapy has attended 1 formal thought session and has participated in some home exercises      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[employer]  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 starting at work while lifting and throwing 30lb bags of trash over his shoulder, experiencing right lower back pain but more significantly right leg pain more severe over the anterior aspect of the knee with radiation down the lower extremity past the knee to the ankle and foot area along with anterior numbness tingling  He was evaluated in urgent care, ER, CT Lumbar spine obtained revealing herniated discs, was treating with chiro  Prescribed prednisone, ibuprofen, tylenol, currently taking Ibuprofen  He did note that he had a decreased reflex of the knee on exam   Currently he feels much improved with his symptoms and is able to moving ambulate much better  He has been on light duty over the past week which is primarily been sedentary  At this point in time he does feel good enough to go back to light duty or close to full duty as he states there is no significant amounts of lifting normally for his job  Denies any changes in bowel or bladder habits  Review of Systems    The following portions of the patient's chart were reviewed and updated as appropriate: Allergy:  No Known Allergies      Past Medical History:   Diagnosis Date    Hyperlipidemia        History reviewed  No pertinent surgical history      Social History     Socioeconomic History    Marital status: /Civil Union     Spouse name: Not on file    Number of children: Not on file    Years of education: Not on file    Highest education level: Not on file   Occupational History    Not on file   Social Needs    Financial resource strain: Not on file    Food insecurity:     Worry: Not on file     Inability: Not on file    Transportation needs:     Medical: Not on file     Non-medical: Not on file   Tobacco Use    Smoking status: Former Smoker     Last attempt to quit:      Years since quittin 0    Smokeless tobacco: Never Used   Substance and Sexual Activity    Alcohol use: No    Drug use: No    Sexual activity: Not on file   Lifestyle    Physical activity:     Days per week: Not on file     Minutes per session: Not on file    Stress: Not on file   Relationships    Social connections:     Talks on phone: Not on file     Gets together: Not on file     Attends Adventism service: Not on file     Active member of club or organization: Not on file     Attends meetings of clubs or organizations: Not on file     Relationship status: Not on file    Intimate partner violence:     Fear of current or ex partner: Not on file     Emotionally abused: Not on file     Physically abused: Not on file     Forced sexual activity: Not on file   Other Topics Concern    Not on file   Social History Narrative    Not on file       Family History   Problem Relation Age of Onset    Stroke Mother     Diabetes Father        Medications:    Current Outpatient Medications:     aspirin (ECOTRIN LOW STRENGTH) 81 mg EC tablet, Take 81 mg by mouth daily, Disp: , Rfl:     atorvastatin (LIPITOR) 40 mg tablet, Take 40 mg by mouth daily, Disp: , Rfl:     Cholecalciferol (D3 VITAMIN PO), Take 5,000 Units by mouth daily, Disp: , Rfl:     doxycycline monohydrate (MONODOX) 50 mg capsule, 50 mg daily, Disp: , Rfl:     meloxicam (MOBIC) 7 5 mg tablet, Take 1 tablet (7 5 mg total) by mouth daily, Disp: 30 tablet, Rfl: 0    Multiple Vitamins-Minerals (OCUVITE PO), Take by mouth daily, Disp: , Rfl:     omeprazole (PriLOSEC) 20 mg delayed release capsule, 20 mg daily, Disp: , Rfl:     There is no problem list on file for this patient  Objective:  /85   Pulse 98   Ht 5' 11" (1 803 m)   Wt 99 5 kg (219 lb 6 4 oz)   BMI 30 60 kg/m²     Back Exam     Muscle Strength   Right Quadriceps:  5/5   Left Quadriceps:  5/5     Reflexes   Patellar: abnormal    Other   Gait: normal             Physical Exam      Neurologic Exam     Motor Exam     Strength   Right quadriceps: 5/5  Left quadriceps: 5/5      Procedures    I have personally reviewed the written report of the pertinent studies       CT Lumbar Spine 10/2019

## 2020-02-05 ENCOUNTER — OFFICE VISIT (OUTPATIENT)
Dept: OBGYN CLINIC | Facility: MEDICAL CENTER | Age: 60
End: 2020-02-05
Payer: OTHER MISCELLANEOUS

## 2020-02-05 VITALS
WEIGHT: 217.2 LBS | HEIGHT: 71 IN | SYSTOLIC BLOOD PRESSURE: 127 MMHG | BODY MASS INDEX: 30.41 KG/M2 | HEART RATE: 74 BPM | DIASTOLIC BLOOD PRESSURE: 82 MMHG

## 2020-02-05 DIAGNOSIS — R29.2 DECREASED RIGHT PATELLAR REFLEX: ICD-10-CM

## 2020-02-05 DIAGNOSIS — M54.16 RADICULOPATHY, LUMBAR REGION: Primary | ICD-10-CM

## 2020-02-05 DIAGNOSIS — M51.26 LUMBAR HERNIATED DISC: ICD-10-CM

## 2020-02-05 DIAGNOSIS — R20.0 RIGHT LEG NUMBNESS: ICD-10-CM

## 2020-02-05 PROCEDURE — 99213 OFFICE O/P EST LOW 20 MIN: CPT | Performed by: EMERGENCY MEDICINE

## 2020-02-05 NOTE — PROGRESS NOTES
Assessment/Plan:    Diagnoses and all orders for this visit:    Radiculopathy, lumbar region  -     Ambulatory referral to Pain Management; Future    Lumbar herniated disc  -     Ambulatory referral to Pain Management; Future    Right leg numbness    Decreased right patellar reflex  -     Ambulatory referral to Pain Management; Future     Patient returns much improved overall he is advancing in physical therapy however he continues with decreased right patellar reflex and mild to minimal weakness of the right leg  I have reviewed the CT scan results which do show a right-sided extruded lumbar disc at the L3-L4 level corresponding to his symptoms  Would like to see him back after his referral to pain management to see what they have to offer for him  Continue work restrictions and therapy  Return for after Pain Management evaluation  Chief Complaint:     Chief Complaint   Patient presents with    Spine - Follow-up       Subjective:   Patient ID: Peter Clemons is a 63 y o  male  WC DOI 10/28/19  Patient returns with improving symptoms, "I feel pretty good", he is advancing in PT and noticing healthy and normal muscle soreness only from the exercises  He does get occasional lower back pain, 3/10, usually after his PT workouts  Other than symptoms associated with PT, he only notices difficulty getting up from kneeling possibly due to weakness  Since last evaluation he has not been taking the meloxicam and is only taking ibuprofen p r n  Lomira Blane work restrictions  Previous note:  Patient returns with mild improvement, pain has improved from 6/10 to 4/10  He states pain in the quad "has gone away"  Tolerating work restrictions  He is requesting more PT  Denies weakness    Taking meloxicam         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[employer]  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 starting at work while lifting and throwing 30lb bags of trash over his shoulder, experiencing right lower back pain but more significantly right leg pain more severe over the anterior aspect of the knee with radiation down the lower extremity past the knee to the ankle and foot area along with anterior numbness tingling  He was evaluated in urgent care, ER, CT Lumbar spine obtained revealing herniated discs, was treating with chiro  Prescribed prednisone, ibuprofen, tylenol, currently taking Ibuprofen  He did note that he had a decreased reflex of the knee on exam   Currently he feels much improved with his symptoms and is able to moving ambulate much better  He has been on light duty over the past week which is primarily been sedentary  At this point in time he does feel good enough to go back to light duty or close to full duty as he states there is no significant amounts of lifting normally for his job  Denies any changes in bowel or bladder habits  Review of Systems    The following portions of the patient's chart were reviewed and updated as appropriate: Allergy:  No Known Allergies      Past Medical History:   Diagnosis Date    Hyperlipidemia        History reviewed  No pertinent surgical history      Social History     Socioeconomic History    Marital status: /Civil Union     Spouse name: Not on file    Number of children: Not on file    Years of education: Not on file    Highest education level: Not on file   Occupational History    Not on file   Social Needs    Financial resource strain: Not on file    Food insecurity:     Worry: Not on file     Inability: Not on file    Transportation needs:     Medical: Not on file     Non-medical: Not on file   Tobacco Use    Smoking status: Former Smoker     Last attempt to quit:      Years since quittin     Smokeless tobacco: Never Used   Substance and Sexual Activity    Alcohol use: No    Drug use: No    Sexual activity: Not on file   Lifestyle    Physical activity:     Days per week: Not on file     Minutes per session: Not on file    Stress: Not on file   Relationships    Social connections:     Talks on phone: Not on file     Gets together: Not on file     Attends Hindu service: Not on file     Active member of club or organization: Not on file     Attends meetings of clubs or organizations: Not on file     Relationship status: Not on file    Intimate partner violence:     Fear of current or ex partner: Not on file     Emotionally abused: Not on file     Physically abused: Not on file     Forced sexual activity: Not on file   Other Topics Concern    Not on file   Social History Narrative    Not on file       Family History   Problem Relation Age of Onset    Stroke Mother     Diabetes Father        Medications:    Current Outpatient Medications:     aspirin (ECOTRIN LOW STRENGTH) 81 mg EC tablet, Take 81 mg by mouth daily, Disp: , Rfl:     atorvastatin (LIPITOR) 40 mg tablet, Take 40 mg by mouth daily, Disp: , Rfl:     Cholecalciferol (D3 VITAMIN PO), Take 5,000 Units by mouth daily, Disp: , Rfl:     doxycycline monohydrate (MONODOX) 50 mg capsule, 50 mg daily, Disp: , Rfl:     Multiple Vitamins-Minerals (OCUVITE PO), Take by mouth daily, Disp: , Rfl:     omeprazole (PriLOSEC) 20 mg delayed release capsule, 20 mg daily, Disp: , Rfl:     There is no problem list on file for this patient  Objective:  /82   Pulse 74   Ht 5' 11" (1 803 m)   Wt 98 5 kg (217 lb 3 2 oz)   BMI 30 29 kg/m²     Back Exam     Reflexes   Patellar: abnormal    Other   Toe walk: normal  Heel walk: normal  Gait: normal     Comments:  Mild weakness of the right quad/knee extension  Vs left            Physical Exam   Constitutional: He is oriented to person, place, and time  He appears well-developed and well-nourished  HENT:   Head: Normocephalic and atraumatic  Eyes: Conjunctivae are normal    Neck: Neck supple  Pulmonary/Chest: Effort normal    Neurological: He is alert and oriented to person, place, and time     Skin: Skin is warm and dry  Psychiatric: He has a normal mood and affect  His behavior is normal    Vitals reviewed  Neurologic Exam     Mental Status   Oriented to person, place, and time  Procedures    I have personally reviewed the written report of the pertinent studies  CT Lumbar Spine  IMPRESSION:     Disc herniations at the L3-4, L4-5 and L5-S1 levels        At L3-4 there is a superiorly extruded right sided disc herniation with lateral recess stenosis and probable compression of the L4 nerve as it extends towards the neural foramen  Correlate for corresponding radiculopathy      Central disc herniations at the L4-5 and L5-S1 levels      Probable renal cysts, incompletely evaluated on this examination  Consider nonemergent renal ultrasound follow-up

## 2020-02-05 NOTE — LETTER
February 5, 2020     Patient: Peter Poag   YOB: 1960   Date of Visit: 2/5/2020       To Whom it May Concern:    Peter Poag is under my professional care  He was seen in my office on 2/5/2020  He may continue restricted duty:  Max lifting 25lb  F/U after Pain Management evaluation  If you have any questions or concerns, please don't hesitate to call           Sincerely,          Remi Martinez MD        CC: No Recipients

## 2020-02-18 ENCOUNTER — TELEPHONE (OUTPATIENT)
Dept: PAIN MEDICINE | Facility: CLINIC | Age: 60
End: 2020-02-18

## 2020-02-18 NOTE — TELEPHONE ENCOUNTER
[facility]  [id number]  DOI 10/28/2019  Adj Nyasia Granados  # [phone]

## 2020-03-11 ENCOUNTER — CONSULT (OUTPATIENT)
Dept: PAIN MEDICINE | Facility: CLINIC | Age: 60
End: 2020-03-11
Payer: OTHER MISCELLANEOUS

## 2020-03-11 VITALS
DIASTOLIC BLOOD PRESSURE: 84 MMHG | RESPIRATION RATE: 20 BRPM | SYSTOLIC BLOOD PRESSURE: 126 MMHG | WEIGHT: 214.6 LBS | HEIGHT: 71 IN | HEART RATE: 69 BPM | BODY MASS INDEX: 30.04 KG/M2

## 2020-03-11 DIAGNOSIS — M51.26 LUMBAR HERNIATED DISC: ICD-10-CM

## 2020-03-11 DIAGNOSIS — M51.26 LUMBAR DISC HERNIATION: ICD-10-CM

## 2020-03-11 DIAGNOSIS — M54.16 RADICULOPATHY, LUMBAR REGION: ICD-10-CM

## 2020-03-11 DIAGNOSIS — R29.2 DECREASED RIGHT PATELLAR REFLEX: ICD-10-CM

## 2020-03-11 DIAGNOSIS — M54.16 LUMBAR RADICULOPATHY: Primary | ICD-10-CM

## 2020-03-11 PROCEDURE — 99244 OFF/OP CNSLTJ NEW/EST MOD 40: CPT | Performed by: ANESTHESIOLOGY

## 2020-03-11 NOTE — PROGRESS NOTES
Assessment  1  Lumbar radiculopathy - Right     2  Radiculopathy, lumbar region  Ambulatory referral to Pain Management   3  Lumbar herniated disc - Right  Ambulatory referral to Pain Management    FL spine and pain procedure   4  Decreased right patellar reflex - Right  Ambulatory referral to Pain Management   5  Lumbar disc herniation - Right         Plan    This is a 71-year-old male who presents today for initial consultation for management of low back pain with radiculitis  On physical examination, there is no gross motor deficits  Sensory testing is intact  There is decreased knee reflex on the right  MRI images were reviewed with patient in detail and all his questions were answered to his satisfaction  The patient's pain persists despite time, relative rest, activity modification and therapy  I recommend a [Right L3, L4] transforaminal epidural steroid injection to diminish any inflammatory component of the pain  We will initially use a transforaminal approach to better concentrate the steroid along the affected nerve root  The injection may need to be repeated based on the degree of pain relief following the initial injection  In the office today, we reviewed the nature of the patient's pathology in depth using diagrams and models  We discussed the approach we would use for the epidural steroid injection and provided literature for home review  The patient understands the risks associated with the procedure including bleeding, infection, tissue injury, allergic reaction and paralysis and provided written and verbal consent in the office today  My impressions and treatment recommendations were discussed in detail with the patient who verbalized understanding and had no further questions  Discharge instructions were provided  I personally saw and examined the patient and I agree with the above discussed plan of care      History of Present Illness    Darleen Ortiz is a 63 y o  male who presents today for initial consultation for management of low back pain and right leg pain  Of note, patient reports moderate pain which he rates 3/10  His pain is occasional, with no typical pattern  Pain is further described as pressure-like associated with numbness  He reports weakness down his right leg  Pain is aggravated with bending, walking and exercise  Patient uses heat/ice with moderate relief of pain  Patient has had chiropractic manipulation with moderate relief of pain  Patient denies bladder and or bowel issues  The patient denies fever or chills  CT scan demonstrates multilevel disc herniations  Patient was referred to me by Dr Allen Sanchez  I have personally reviewed and/or updated the patient's past medical history, past surgical history, family history, social history, current medications, allergies, and vital signs today  Review of Systems   Constitutional: Negative for fever and unexpected weight change  HENT: Negative for trouble swallowing  Eyes: Negative for visual disturbance  Respiratory: Negative for shortness of breath and wheezing  Cardiovascular: Negative for chest pain and palpitations  Gastrointestinal: Negative for constipation, diarrhea, nausea and vomiting  Endocrine: Negative for cold intolerance, heat intolerance and polydipsia  Genitourinary: Negative for difficulty urinating and frequency  Musculoskeletal: Positive for arthralgias, back pain and myalgias  Negative for gait problem and joint swelling  Skin: Negative for rash  Neurological: Positive for numbness  Negative for dizziness, seizures, syncope, weakness and headaches  Hematological: Does not bruise/bleed easily  Psychiatric/Behavioral: Negative for dysphoric mood  All other systems reviewed and are negative  There is no problem list on file for this patient  Past Medical History:   Diagnosis Date    Hyperlipidemia        History reviewed   No pertinent surgical history  Family History   Problem Relation Age of Onset   [place] Stroke Mother     Diabetes Father        Social History     Occupational History    Not on file   Tobacco Use    Smoking status: Former Smoker     Last attempt to quit:      Years since quittin 2    Smokeless tobacco: Never Used   Substance and Sexual Activity    Alcohol use: No    Drug use: No    Sexual activity: Not on file       Current Outpatient Medications on File Prior to Visit   Medication Sig    aspirin (ECOTRIN LOW STRENGTH) 81 mg EC tablet Take 81 mg by mouth daily    atorvastatin (LIPITOR) 40 mg tablet Take 40 mg by mouth daily    Cholecalciferol (D3 VITAMIN PO) Take 5,000 Units by mouth daily    doxycycline monohydrate (MONODOX) 50 mg capsule 50 mg daily    Multiple Vitamins-Minerals (OCUVITE PO) Take by mouth daily    omeprazole (PriLOSEC) 20 mg delayed release capsule 20 mg daily     No current facility-administered medications on file prior to visit  No Known Allergies    Physical Exam    /84   Pulse 69   Resp 20   Ht 5' 11" (1 803 m)   Wt 97 3 kg (214 lb 9 6 oz)   BMI 29 93 kg/m²     Constitutional: normal, well developed, well nourished, alert, in no distress and non-toxic and no overt pain behavior    Eyes: anicteric  HEENT: grossly intact  Neck: supple, symmetric, trachea midline and no masses   Pulmonary:even and unlabored  Cardiovascular:No edema or pitting edema present  Skin:Normal without rashes or lesions and well hydrated  Psychiatric:Mood and affect appropriate  Neurologic:Cranial Nerves II-XII grossly intact  Musculoskeletal:normal    Lumbar Spine Exam    Appearance:  Normal lordosis  Palpation/Tenderness:  left lumbar paraspinal tenderness  right lumbar paraspinal tenderness  Sensory:  no sensory deficits noted  Range of Motion:  Extension:  Moderately limited  with pain  Motor Strength:  Left foot dorsiflexion:  5/5  Left foot plantar flexion:  5/5  Right foot dorsiflexion:  5/5  Right foot plantar flexion:  5/5  Reflexes:  Right knee patella reflex 1+    Imaging    CT LUMBAR SPINE     INDICATION:   Back pain or radiculopathy, < 6 wks, uncomplicated      COMPARISON: None      TECHNIQUE:  Contiguous axial images through the lumbar spine were obtained  Sagittal and coronal reconstructions were performed        Radiation dose length product (DLP) for this visit:  779 mGy-cm   This examination, like all CT scans performed in the [facility], was performed utilizing techniques to minimize radiation dose exposure, including the use of iterative   reconstruction and automated exposure control        IMAGE QUALITY:  Diagnostic      FINDINGS:     ALIGNMENT:  Normal alignment of the lumbar spine  No spondylolisthesis or spondylolysis      VERTEBRAL BODIES:  No fracture  No lytic or blastic lesion      DEGENERATIVE CHANGES:     Lower Thoracic spine:  Normal lower thoracic disc spaces  ]     L1-2:  Normal disc height  No herniation  Normal facet joints  No canal or foraminal stenosis      L2-3:  Normal disc height  No herniation  Normal facet joints  No canal or foraminal stenosis      L3-4:  Slight loss of disc height with mild annular bulging  There is a superiorly extruded disc herniation in the right paramedian and subarticular aspect of the disc resulting in lateral recess stenosis and mass effect upon the right L3 nerve      There is a more inferiorly located central and left paracentral disc herniation distorting the anterolateral aspect of the thecal sac with mild left lateral canal stenosis  No L3 nerve impingement on the left      L4-5:  Mild annular bulging with a very small central disc protrusion  No canal stenosis  No foraminal nerve impingement      L5-S1:  Mild annular bulging with a moderate central disc protrusion abutting the ventral aspect of the thecal sac and both S1 nerves as they exit the thecal sac without discrete compression    No foraminal nerve impingement      PARASPINAL SOFT TISSUES:  Mild vascular calcification of the aorta  Suspected simple suspected renal cysts bilaterally, incompletely evaluated on this noncontrast examination      IMPRESSION:     Disc herniations at the L3-4, L4-5 and L5-S1 levels        At L3-4 there is a superiorly extruded right sided disc herniation with lateral recess stenosis and probable compression of the L4 nerve as it extends towards the neural foramen  Correlate for corresponding radiculopathy      Central disc herniations at the L4-5 and L5-S1 levels      Probable renal cysts, incompletely evaluated on this examination    Consider nonemergent renal ultrasound follow-up

## 2020-05-19 ENCOUNTER — HOSPITAL ENCOUNTER (OUTPATIENT)
Dept: RADIOLOGY | Facility: CLINIC | Age: 60
Discharge: HOME | End: 2020-05-19
Attending: ANESTHESIOLOGY | Admitting: ANESTHESIOLOGY
Payer: OTHER MISCELLANEOUS

## 2020-05-19 VITALS
DIASTOLIC BLOOD PRESSURE: 74 MMHG | RESPIRATION RATE: 20 BRPM | OXYGEN SATURATION: 95 % | TEMPERATURE: 97.9 F | HEART RATE: 70 BPM | SYSTOLIC BLOOD PRESSURE: 123 MMHG

## 2020-05-19 DIAGNOSIS — M51.26 LUMBAR HERNIATED DISC: ICD-10-CM

## 2020-05-19 PROCEDURE — 64484 NJX AA&/STRD TFRM EPI L/S EA: CPT | Performed by: ANESTHESIOLOGY

## 2020-05-19 PROCEDURE — 64483 NJX AA&/STRD TFRM EPI L/S 1: CPT | Performed by: ANESTHESIOLOGY

## 2020-05-19 RX ORDER — PAPAVERINE HCL 150 MG
20 CAPSULE, EXTENDED RELEASE ORAL ONCE
Status: COMPLETED | OUTPATIENT
Start: 2020-05-19 | End: 2020-05-19

## 2020-05-19 RX ORDER — LIDOCAINE HYDROCHLORIDE 10 MG/ML
5 INJECTION, SOLUTION EPIDURAL; INFILTRATION; INTRACAUDAL; PERINEURAL ONCE
Status: COMPLETED | OUTPATIENT
Start: 2020-05-19 | End: 2020-05-19

## 2020-05-19 RX ORDER — BUPIVACAINE HCL/PF 2.5 MG/ML
5 VIAL (ML) INJECTION ONCE
Status: COMPLETED | OUTPATIENT
Start: 2020-05-19 | End: 2020-05-19

## 2020-05-19 RX ADMIN — IOHEXOL 2 ML: 300 INJECTION, SOLUTION INTRAVENOUS at 09:58

## 2020-05-19 RX ADMIN — LIDOCAINE HYDROCHLORIDE 5 ML: 10 INJECTION, SOLUTION EPIDURAL; INFILTRATION; INTRACAUDAL; PERINEURAL at 09:58

## 2020-05-19 RX ADMIN — Medication 5 ML: at 09:58

## 2020-05-19 RX ADMIN — DEXAMETHASONE SODIUM PHOSPHATE 20 MG: 10 INJECTION, SOLUTION INTRAMUSCULAR; INTRAVENOUS at 09:58

## 2020-05-26 ENCOUNTER — TELEPHONE (OUTPATIENT)
Dept: PAIN MEDICINE | Facility: CLINIC | Age: 60
End: 2020-05-26

## 2020-06-23 ENCOUNTER — OFFICE VISIT (OUTPATIENT)
Dept: OBGYN CLINIC | Facility: MEDICAL CENTER | Age: 60
End: 2020-06-23
Payer: OTHER MISCELLANEOUS

## 2020-06-23 VITALS
WEIGHT: 222.8 LBS | DIASTOLIC BLOOD PRESSURE: 88 MMHG | BODY MASS INDEX: 31.19 KG/M2 | HEART RATE: 71 BPM | SYSTOLIC BLOOD PRESSURE: 135 MMHG | HEIGHT: 71 IN

## 2020-06-23 DIAGNOSIS — M54.16 RADICULOPATHY, LUMBAR REGION: Primary | ICD-10-CM

## 2020-06-23 DIAGNOSIS — R29.2 DECREASED RIGHT PATELLAR REFLEX: ICD-10-CM

## 2020-06-23 DIAGNOSIS — R20.0 RIGHT LEG NUMBNESS: ICD-10-CM

## 2020-06-23 PROCEDURE — 99213 OFFICE O/P EST LOW 20 MIN: CPT | Performed by: EMERGENCY MEDICINE

## 2020-06-23 RX ORDER — MELOXICAM 7.5 MG/1
7.5 TABLET ORAL DAILY
Qty: 30 TABLET | Refills: 0 | Status: SHIPPED | OUTPATIENT
Start: 2020-06-23

## 2020-07-07 ENCOUNTER — OFFICE VISIT (OUTPATIENT)
Dept: OBGYN CLINIC | Facility: MEDICAL CENTER | Age: 60
End: 2020-07-07
Payer: OTHER MISCELLANEOUS

## 2020-07-07 VITALS
BODY MASS INDEX: 31.02 KG/M2 | SYSTOLIC BLOOD PRESSURE: 118 MMHG | WEIGHT: 221.6 LBS | HEIGHT: 71 IN | DIASTOLIC BLOOD PRESSURE: 81 MMHG | HEART RATE: 76 BPM

## 2020-07-07 DIAGNOSIS — M51.26 LUMBAR HERNIATED DISC: ICD-10-CM

## 2020-07-07 DIAGNOSIS — R29.2 DECREASED RIGHT PATELLAR REFLEX: ICD-10-CM

## 2020-07-07 DIAGNOSIS — R20.0 RIGHT LEG NUMBNESS: ICD-10-CM

## 2020-07-07 DIAGNOSIS — M54.16 RADICULOPATHY, LUMBAR REGION: Primary | ICD-10-CM

## 2020-07-07 PROCEDURE — 99213 OFFICE O/P EST LOW 20 MIN: CPT | Performed by: EMERGENCY MEDICINE

## 2020-07-07 NOTE — LETTER
July 7, 2020     Patient: Jemma Mccrary   YOB: 1960   Date of Visit: 7/7/2020       To Whom it May Concern:    Jemma Mccrary is under my professional care  He was seen in my office on 7/7/2020  Continue full duty  Follow up as needed  If you have any questions or concerns, please don't hesitate to call           Sincerely,          Jay Brooks MD        CC: No Recipients

## 2020-07-07 NOTE — PROGRESS NOTES
Assessment/Plan:    Diagnoses and all orders for this visit:    Radiculopathy, lumbar region    Decreased right patellar reflex    Lumbar herniated disc    Right leg numbness     patient is very happy with improvement tolerating full duty denies any pain today states he is 85-90% back to baseline  He does note a history of back pain intermittently  At this point in time we will see him on an as-needed basis  Return if symptoms worsen or fail to improve  Chief Complaint:     Chief Complaint   Patient presents with    Spine - Follow-up       Subjective:   Patient ID: Maryse Laura is a 62 y o  male  WC DOI 10/28/19  Patient returns s/p trial of full duty which he tolerated well, he states he is 85-90% back to baseline, happy with improvement  Denies any pain today  States he only had to take the meloxicam 3 days since last evaluation  S/P RIGHT L3, L4 TFESI,       Review of Systems    The following portions of the patient's chart were reviewed and updated as appropriate: Allergy:  No Known Allergies      Past Medical History:   Diagnosis Date    Hyperlipidemia        History reviewed  No pertinent surgical history      Social History     Socioeconomic History    Marital status: /Civil Union     Spouse name: Not on file    Number of children: Not on file    Years of education: Not on file    Highest education level: Not on file   Occupational History    Not on file   Social Needs    Financial resource strain: Not on file    Food insecurity:     Worry: Not on file     Inability: Not on file    Transportation needs:     Medical: Not on file     Non-medical: Not on file   Tobacco Use    Smoking status: Former Smoker     Last attempt to quit:      Years since quittin 5    Smokeless tobacco: Never Used   Substance and Sexual Activity    Alcohol use: No    Drug use: No    Sexual activity: Not on file   Lifestyle    Physical activity:     Days per week: Not on file     Minutes per session: Not on file    Stress: Not on file   Relationships    Social connections:     Talks on phone: Not on file     Gets together: Not on file     Attends Advent service: Not on file     Active member of club or organization: Not on file     Attends meetings of clubs or organizations: Not on file     Relationship status: Not on file    Intimate partner violence:     Fear of current or ex partner: Not on file     Emotionally abused: Not on file     Physically abused: Not on file     Forced sexual activity: Not on file   Other Topics Concern    Not on file   Social History Narrative    Not on file       Family History   Problem Relation Age of Onset    Stroke Mother     Diabetes Father        Medications:    Current Outpatient Medications:     aspirin (ECOTRIN LOW STRENGTH) 81 mg EC tablet, Take 81 mg by mouth daily, Disp: , Rfl:     atorvastatin (LIPITOR) 40 mg tablet, Take 40 mg by mouth daily, Disp: , Rfl:     Cholecalciferol (D3 VITAMIN PO), Take 5,000 Units by mouth daily, Disp: , Rfl:     doxycycline monohydrate (MONODOX) 50 mg capsule, 50 mg daily, Disp: , Rfl:     meloxicam (MOBIC) 7 5 mg tablet, Take 1 tablet (7 5 mg total) by mouth daily, Disp: 30 tablet, Rfl: 0    Multiple Vitamins-Minerals (OCUVITE PO), Take by mouth daily, Disp: , Rfl:     omeprazole (PriLOSEC) 20 mg delayed release capsule, 20 mg daily, Disp: , Rfl:     Patient Active Problem List   Diagnosis    Lumbar radiculopathy    Decreased right patellar reflex - Right    Lumbar disc herniation       Objective:  /81   Pulse 76   Ht 5' 11" (1 803 m)   Wt 101 kg (221 lb 9 6 oz)   BMI 30 91 kg/m²     Back Exam     Range of Motion   The patient has normal back ROM  Other   Toe walk: normal  Heel walk: normal  Gait: normal             Physical Exam   Constitutional: He is oriented to person, place, and time  He appears well-developed and well-nourished  HENT:   Head: Normocephalic and atraumatic     Eyes: Conjunctivae are normal    Neck: Neck supple  Pulmonary/Chest: Effort normal    Neurological: He is alert and oriented to person, place, and time  Skin: Skin is warm and dry  Psychiatric: He has a normal mood and affect  His behavior is normal    Vitals reviewed  Neurologic Exam     Mental Status   Oriented to person, place, and time  Procedures    I have personally reviewed the written report of the pertinent studies

## 2020-07-18 DIAGNOSIS — R29.2 DECREASED RIGHT PATELLAR REFLEX: ICD-10-CM

## 2020-07-18 DIAGNOSIS — M54.16 RADICULOPATHY, LUMBAR REGION: ICD-10-CM

## 2020-07-18 DIAGNOSIS — R20.0 RIGHT LEG NUMBNESS: ICD-10-CM

## 2020-07-20 RX ORDER — MELOXICAM 7.5 MG/1
TABLET ORAL
Qty: 30 TABLET | Refills: 0 | OUTPATIENT
Start: 2020-07-20

## 2021-06-13 ENCOUNTER — APPOINTMENT (OUTPATIENT)
Dept: RADIOLOGY | Facility: CLINIC | Age: 61
End: 2021-06-13
Payer: OTHER MISCELLANEOUS

## 2021-06-13 ENCOUNTER — OCCMED (OUTPATIENT)
Dept: URGENT CARE | Facility: CLINIC | Age: 61
End: 2021-06-13
Payer: OTHER MISCELLANEOUS

## 2021-06-13 DIAGNOSIS — M25.571 RIGHT ANKLE PAIN, UNSPECIFIED CHRONICITY: Primary | ICD-10-CM

## 2021-06-13 DIAGNOSIS — M25.571 RIGHT ANKLE PAIN, UNSPECIFIED CHRONICITY: ICD-10-CM

## 2021-06-13 PROCEDURE — 99283 EMERGENCY DEPT VISIT LOW MDM: CPT | Performed by: NURSE PRACTITIONER

## 2021-06-13 PROCEDURE — 73610 X-RAY EXAM OF ANKLE: CPT

## 2021-06-13 PROCEDURE — G0382 LEV 3 HOSP TYPE B ED VISIT: HCPCS | Performed by: NURSE PRACTITIONER

## 2021-06-13 PROCEDURE — 73630 X-RAY EXAM OF FOOT: CPT

## 2021-06-17 ENCOUNTER — APPOINTMENT (OUTPATIENT)
Dept: URGENT CARE | Facility: CLINIC | Age: 61
End: 2021-06-17
Payer: OTHER MISCELLANEOUS

## 2021-06-17 PROCEDURE — 99213 OFFICE O/P EST LOW 20 MIN: CPT

## 2021-06-30 ENCOUNTER — APPOINTMENT (OUTPATIENT)
Dept: URGENT CARE | Facility: CLINIC | Age: 61
End: 2021-06-30
Payer: OTHER MISCELLANEOUS

## 2021-06-30 PROCEDURE — 99213 OFFICE O/P EST LOW 20 MIN: CPT | Performed by: PHYSICIAN ASSISTANT

## 2022-11-03 ENCOUNTER — APPOINTMENT (OUTPATIENT)
Dept: PHYSICAL THERAPY | Facility: CLINIC | Age: 62
End: 2022-11-03

## 2024-04-20 ENCOUNTER — HOSPITAL ENCOUNTER (EMERGENCY)
Facility: HOSPITAL | Age: 64
Discharge: HOME | End: 2024-04-20
Attending: EMERGENCY MEDICINE | Admitting: EMERGENCY MEDICINE
Payer: COMMERCIAL

## 2024-04-20 ENCOUNTER — APPOINTMENT (EMERGENCY)
Dept: RADIOLOGY | Facility: HOSPITAL | Age: 64
End: 2024-04-20
Payer: COMMERCIAL

## 2024-04-20 VITALS
DIASTOLIC BLOOD PRESSURE: 76 MMHG | HEIGHT: 71 IN | BODY MASS INDEX: 30.1 KG/M2 | TEMPERATURE: 99.5 F | OXYGEN SATURATION: 93 % | RESPIRATION RATE: 18 BRPM | SYSTOLIC BLOOD PRESSURE: 144 MMHG | HEART RATE: 72 BPM | WEIGHT: 215 LBS

## 2024-04-20 DIAGNOSIS — J40 BRONCHITIS: Primary | ICD-10-CM

## 2024-04-20 LAB
FLUAV RNA RESP QL NAA+PROBE: NEGATIVE
FLUBV RNA RESP QL NAA+PROBE: NEGATIVE
RSV RNA RESP QL NAA+PROBE: NEGATIVE
SARS-COV-2 RNA RESP QL NAA+PROBE: NEGATIVE

## 2024-04-20 PROCEDURE — 71045 X-RAY EXAM CHEST 1 VIEW: CPT

## 2024-04-20 PROCEDURE — 99284 EMERGENCY DEPT VISIT MOD MDM: CPT

## 2024-04-20 PROCEDURE — 0241U HB NFCT DS VIR RESP RNA 4 TRGT: CPT | Performed by: NURSE PRACTITIONER

## 2024-04-20 PROCEDURE — 99284 EMERGENCY DEPT VISIT MOD MDM: CPT | Performed by: NURSE PRACTITIONER

## 2024-04-20 RX ORDER — PREDNISONE 20 MG/1
60 TABLET ORAL DAILY
Qty: 15 TABLET | Refills: 0 | Status: SHIPPED | OUTPATIENT
Start: 2024-04-20 | End: 2024-04-25

## 2024-04-20 RX ORDER — PREDNISONE 20 MG/1
60 TABLET ORAL DAILY
Qty: 15 TABLET | Refills: 0 | Status: SHIPPED | OUTPATIENT
Start: 2024-04-20 | End: 2024-04-20

## 2024-04-20 RX ORDER — IPRATROPIUM BROMIDE AND ALBUTEROL SULFATE 2.5; .5 MG/3ML; MG/3ML
3 SOLUTION RESPIRATORY (INHALATION) ONCE
Status: COMPLETED | OUTPATIENT
Start: 2024-04-20 | End: 2024-04-20

## 2024-04-20 RX ORDER — IPRATROPIUM BROMIDE AND ALBUTEROL SULFATE 2.5; .5 MG/3ML; MG/3ML
3 SOLUTION RESPIRATORY (INHALATION) 4 TIMES DAILY
Qty: 60 ML | Refills: 0 | Status: SHIPPED | OUTPATIENT
Start: 2024-04-20 | End: 2024-04-25

## 2024-04-20 RX ORDER — AMOXICILLIN 500 MG/1
500 CAPSULE ORAL 3 TIMES DAILY
Qty: 21 CAPSULE | Refills: 0 | Status: SHIPPED | OUTPATIENT
Start: 2024-04-20 | End: 2024-04-20

## 2024-04-20 RX ORDER — AMOXICILLIN 500 MG/1
500 CAPSULE ORAL 3 TIMES DAILY
Qty: 21 CAPSULE | Refills: 0 | Status: SHIPPED | OUTPATIENT
Start: 2024-04-20 | End: 2024-04-27

## 2024-04-20 RX ORDER — SODIUM CHLORIDE FOR INHALATION 0.9 %
3 VIAL, NEBULIZER (ML) INHALATION ONCE
Status: COMPLETED | OUTPATIENT
Start: 2024-04-20 | End: 2024-04-20

## 2024-04-20 RX ORDER — ALBUTEROL SULFATE 90 UG/1
2 AEROSOL, METERED RESPIRATORY (INHALATION) EVERY 6 HOURS PRN
Qty: 6.7 G | Refills: 0 | Status: SHIPPED | OUTPATIENT
Start: 2024-04-20 | End: 2024-04-20 | Stop reason: ALTCHOICE

## 2024-04-20 RX ORDER — KETOROLAC TROMETHAMINE 30 MG/ML
15 INJECTION, SOLUTION INTRAMUSCULAR; INTRAVENOUS ONCE
Status: COMPLETED | OUTPATIENT
Start: 2024-04-20 | End: 2024-04-20

## 2024-04-20 RX ADMIN — ISODIUM CHLORIDE 3 ML: 0.03 SOLUTION RESPIRATORY (INHALATION) at 09:15

## 2024-04-20 RX ADMIN — KETOROLAC TROMETHAMINE 15 MG: 30 INJECTION, SOLUTION INTRAMUSCULAR; INTRAVENOUS at 10:52

## 2024-04-20 RX ADMIN — IPRATROPIUM BROMIDE AND ALBUTEROL SULFATE 3 ML: 2.5; .5 SOLUTION RESPIRATORY (INHALATION) at 09:15

## 2024-04-20 NOTE — ED PROVIDER NOTES
History  Chief Complaint   Patient presents with    Fever     Pt c/o fever that started on Monday, pt also c/o cough and headache     63-year-old male patient presenting here with a chief complaint of cough and chest congestion for the last few days.  He reports fever and chills for about a week.  Has been taking over-the-counter's without any relief.  Endorsing some wheezing especially at nighttime.          Prior to Admission Medications   Prescriptions Last Dose Informant Patient Reported? Taking?   Cholecalciferol (D3 VITAMIN PO)  Self Yes No   Sig: Take 5,000 Units by mouth daily   Multiple Vitamins-Minerals (OCUVITE PO)  Self Yes No   Sig: Take by mouth daily   aspirin (ECOTRIN LOW STRENGTH) 81 mg EC tablet  Self Yes No   Sig: Take 81 mg by mouth daily   atorvastatin (LIPITOR) 40 mg tablet  Self Yes No   Sig: Take 40 mg by mouth daily   doxycycline monohydrate (MONODOX) 50 mg capsule  Self Yes No   Si mg daily   meloxicam (MOBIC) 7.5 mg tablet  Self No No   Sig: Take 1 tablet (7.5 mg total) by mouth daily   omeprazole (PriLOSEC) 20 mg delayed release capsule  Self Yes No   Si mg daily      Facility-Administered Medications: None       Past Medical History:   Diagnosis Date    Hyperlipidemia        History reviewed. No pertinent surgical history.    Family History   Problem Relation Age of Onset    Stroke Mother     Diabetes Father      I have reviewed and agree with the history as documented.    E-Cigarette/Vaping    E-Cigarette Use Never User      E-Cigarette/Vaping Substances     Social History     Tobacco Use    Smoking status: Former     Current packs/day: 0.00     Types: Cigarettes     Quit date:      Years since quittin.3    Smokeless tobacco: Never   Vaping Use    Vaping status: Never Used   Substance Use Topics    Alcohol use: No    Drug use: No       Review of Systems   Constitutional:  Negative for chills and fever.   HENT:  Negative for ear pain and sore throat.    Eyes:  Negative  for pain and visual disturbance.   Respiratory:  Positive for cough and wheezing. Negative for shortness of breath.    Cardiovascular:  Negative for chest pain and palpitations.   Gastrointestinal:  Negative for abdominal pain and vomiting.   Genitourinary:  Negative for dysuria and hematuria.   Musculoskeletal:  Negative for arthralgias and back pain.   Skin:  Negative for color change and rash.   Neurological:  Negative for seizures and syncope.   All other systems reviewed and are negative.      Physical Exam  Physical Exam  Vitals and nursing note reviewed.   Constitutional:       General: He is not in acute distress.     Appearance: He is well-developed.   HENT:      Head: Normocephalic and atraumatic.   Eyes:      General:         Right eye: No discharge.         Left eye: No discharge.      Conjunctiva/sclera: Conjunctivae normal.   Cardiovascular:      Rate and Rhythm: Normal rate.   Pulmonary:      Effort: Pulmonary effort is normal. No respiratory distress.   Abdominal:      General: There is no distension.      Tenderness: There is no guarding.   Musculoskeletal:         General: No deformity.      Cervical back: Normal range of motion and neck supple.   Skin:     General: Skin is warm and dry.   Neurological:      Mental Status: He is alert and oriented to person, place, and time.      Coordination: Coordination normal.         Vital Signs  ED Triage Vitals [04/20/24 0901]   Temperature Pulse Respirations Blood Pressure SpO2   99.5 °F (37.5 °C) 72 18 144/76 93 %      Temp Source Heart Rate Source Patient Position - Orthostatic VS BP Location FiO2 (%)   Oral Monitor Sitting Left arm --      Pain Score       --           Vitals:    04/20/24 0901   BP: 144/76   Pulse: 72   Patient Position - Orthostatic VS: Sitting         Visual Acuity      ED Medications  Medications   ipratropium-albuterol (DUO-NEB) 0.5-2.5 mg/3 mL inhalation solution 3 mL (3 mL Nebulization Given 4/20/24 0915)   sodium chloride 0.9 %  inhalation solution 3 mL (3 mL Nebulization Given 4/20/24 0915)   ketorolac (TORADOL) injection 15 mg (15 mg Intramuscular Given 4/20/24 1052)       Diagnostic Studies  Results Reviewed       Procedure Component Value Units Date/Time    FLU/RSV/COVID - if FLU/RSV clinically relevant [[id number]]  (Normal) Collected: 04/20/24 0915    Lab Status: Final result Specimen: Nares from Nose Updated: 04/20/24 1024     SARS-CoV-2 Negative     INFLUENZA A PCR Negative     INFLUENZA B PCR Negative     RSV PCR Negative    Narrative:      FOR PEDIATRIC PATIENTS - copy/paste COVID Guidelines URL to browser: [email or web address]    SARS-CoV-2 assay is a Nucleic Acid Amplification assay intended for the  qualitative detection of nucleic acid from SARS-CoV-2 in nasopharyngeal  swabs. Results are for the presumptive identification of SARS-CoV-2 RNA.    Positive results are indicative of infection with SARS-CoV-2, the virus  causing COVID-19, but do not rule out bacterial infection or co-infection  with other viruses. Laboratories within the [place] and its  territories are required to report all positive results to the appropriate  public health authorities. Negative results do not preclude SARS-CoV-2  infection and should not be used as the sole basis for treatment or other  patient management decisions. Negative results must be combined with  clinical observations, patient history, and epidemiological information.  This test has not been FDA cleared or approved.    This test has been authorized by FDA under an Emergency Use Authorization  (EUA). This test is only authorized for the duration of time the  declaration that circumstances exist justifying the authorization of the  emergency use of an in vitro diagnostic tests for detection of SARS-CoV-2  virus and/or diagnosis of COVID-19 infection under section 564(b)(1) of  the Act, 21 U.S.C. 360bbb-3(b)(1), unless the  authorization is terminated  or revoked sooner. The test has been validated but independent review by FDA  and CLIA is pending.    Test performed using [employer] GeneXpert: This RT-PCR assay targets N2,  a region unique to SARS-CoV-2. A conserved region in the E-gene was chosen  for pan-Sarbecovirus detection which includes SARS-CoV-2.    According to CMS-2020-01-R, this platform meets the definition of high-throughput technology.                   XR chest 1 view portable   Final Result by Herbie Mckeon MD (04/20 1116)      Mild patchy density suggested in the right infrahilar region could be related to atelectasis but cannot exclude developing infiltrate.      The study was marked in EPIC for immediate notification.         Resident: MANI JENSEN I, the attending radiologist, have reviewed the images and agree with the final report above.      Workstation performed: [id number]                    Procedures  Procedures         ED Course                               SBIRT 20yo+      Flowsheet Row Most Recent Value   Initial Alcohol Screen: US AUDIT-C     1. How often do you have a drink containing alcohol? 0 Filed at: 04/20/2024 0903   2. How many drinks containing alcohol do you have on a typical day you are drinking?  0 Filed at: 04/20/2024 0903   3a. Male UNDER 65: How often do you have five or more drinks on one occasion? 0 Filed at: 04/20/2024 0903   Audit-C Score 0 Filed at: 04/20/2024 0903   LUIS ENRIQUE: How many times in the past year have you...    Used an illegal drug or used a prescription medication for non-medical reasons? Never Filed at: 04/20/2024 0903                      Medical Decision Making  Symptoms consistent with acute bronchitis.  Will begin antibiotic therapy and steroid therapy as well as home nebulizer therapy.  Return precautions discussed.     Amount and/or Complexity of Data Reviewed  Radiology: ordered.    Risk  Prescription drug management.             Disposition  Final diagnoses:    Bronchitis     Time reflects when diagnosis was documented in both MDM as applicable and the Disposition within this note       Time User Action Codes Description Comment    4/20/2024 10:42 AM Raman De Guzman Add [J40] Bronchitis           ED Disposition       ED Disposition   Discharge    Condition   Stable    Date/Time   Sat Apr 20, 2024 1042    Comment   Apolinar Ruiz discharge to home/self care.                   Follow-up Information       Follow up With Specialties Details Why Contact Info    August Colon MD [facility]  For [facility] [address]  [address]  [place] [address]  [phone]              Discharge Medication List as of 4/20/2024 10:50 AM        START taking these medications    Details   ipratropium-albuterol (DUO-NEB) 0.5-2.5 mg/3 mL nebulizer solution Take 3 mL by nebulization 4 (four) times a day for 5 days, Starting Sat 4/20/2024, Until Thu 4/25/2024, Normal           CONTINUE these medications which have CHANGED    Details   amoxicillin (AMOXIL) 500 mg capsule Take 1 capsule (500 mg total) by mouth 3 (three) times a day for 7 days, Starting Sat 4/20/2024, Until Sat 4/27/2024, Normal      predniSONE 20 mg tablet Take 3 tablets (60 mg total) by mouth daily for 5 days, Starting Sat 4/20/2024, Until Thu 4/25/2024, Normal           CONTINUE these medications which have NOT CHANGED    Details   aspirin (ECOTRIN LOW STRENGTH) 81 mg EC tablet Take 81 mg by mouth daily, Historical Med      atorvastatin (LIPITOR) 40 mg tablet Take 40 mg by mouth daily, Starting Thu 11/14/2019, Historical Med      Cholecalciferol (D3 VITAMIN PO) Take 5,000 Units by mouth daily, Historical Med      doxycycline monohydrate (MONODOX) 50 mg capsule 50 mg daily, Starting Thu 12/29/2016, Historical Med      meloxicam (MOBIC) 7.5 mg tablet Take 1 tablet (7.5 mg total) by mouth daily, Starting Tue 6/23/2020, Normal      Multiple Vitamins-Minerals (OCUVITE PO) Take by mouth daily, Historical Med      omeprazole  (PriLOSEC) 20 mg delayed release capsule 20 mg daily, Starting Fri 10/28/2016, Historical Med             No discharge procedures on file.    PDMP Review       None            ED Provider  Electronically Signed by             CELE Guidry  04/20/24 5542

## 2024-09-26 ENCOUNTER — APPOINTMENT (OUTPATIENT)
Dept: PHYSICAL THERAPY | Facility: CLINIC | Age: 64
End: 2024-09-26

## 2024-09-26 ENCOUNTER — APPOINTMENT (OUTPATIENT)
Dept: URGENT CARE | Facility: CLINIC | Age: 64
End: 2024-09-26

## 2024-09-26 PROCEDURE — 97530 THERAPEUTIC ACTIVITIES: CPT
